# Patient Record
Sex: MALE | Race: WHITE | NOT HISPANIC OR LATINO | Employment: FULL TIME | URBAN - METROPOLITAN AREA
[De-identification: names, ages, dates, MRNs, and addresses within clinical notes are randomized per-mention and may not be internally consistent; named-entity substitution may affect disease eponyms.]

---

## 2018-01-13 NOTE — RESULT NOTES
Verified Results  (1) TISSUE EXAM [id number] 11:30AM Ivette Ashton     Test Name Result Flag Reference   LAB AP CASE REPORT (Report)     Surgical Pathology Report             Case: [id number]                   Authorizing Provider: London Villasenor MD    Collected:      2016 1130        Pathologist:      Chidi Hein MD      Received:      2016 0907        Specimen:  Skin, Cyst/Tag/Debridement, Chin   LAB AP FINAL DIAGNOSIS (Report)     A  Skin, Cyst/Tag/Debridement, Cyst on chin, excision:  - Ruptured epidermal cyst    -- Fragment of residual epidermal cyst wall, scar, inflamed granulation   tissue and      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 or [employer]  They may not be cleared or approved by the [place]  Food and   Drug Administration  The FDA has determined that such clearance or   approval is not necessary  These tests are used for clinical purposes  They should not be regarded as investigational or for research  This   laboratory has been approved by [place] 88, designated as a high-complexity   laboratory and is qualified to perform these tests  LAB AP GROSS DESCRIPTION (Report)     A  The specimen is received in formalin, labeled with the patient's name   and hospital number, and is designated cyst on chin  The specimen   consists of 2 tan yellow fatty tissue fragments each measuring 0 9 cm  No   skin is identified grossly  Entirely submitted  One cassette  Note: The estimated total formalin fixation time based upon information   provided by the submitting clinician and the standard processing schedule   is over 72 hours   MAC

## 2018-01-16 NOTE — MISCELLANEOUS
Message     Recorded as Task   Date: 08/29/2016 04:07 PM, Created By: Gracie Flores   Task Name: Call Patient with results   Assigned To: Gracie Flores   Regarding Patient: Daiana Monsivais, Status: Active   Comment:    Gracie Flores - 29 Aug 2016 4:07 PM     Patient Phone: [phone]    let him know benign csyt  Patient notified  Active Problems    1  Asthma (493 90) (J45 909)   2  Swelling, mass, or lump on face (784 2) (R22 0)    Current Meds   1  No Reported Medications Recorded    Allergies    1   No Known Drug Allergies    Signatures   Electronically signed by : Heike Gregg, ; Aug 30 2016  2:22PM EST                       (Author)

## 2019-02-02 ENCOUNTER — OFFICE VISIT (OUTPATIENT)
Dept: URGENT CARE | Age: 39
End: 2019-02-02
Payer: COMMERCIAL

## 2019-02-02 VITALS
RESPIRATION RATE: 16 BRPM | BODY MASS INDEX: 23.95 KG/M2 | OXYGEN SATURATION: 99 % | HEIGHT: 68 IN | TEMPERATURE: 97.6 F | WEIGHT: 158 LBS | DIASTOLIC BLOOD PRESSURE: 75 MMHG | SYSTOLIC BLOOD PRESSURE: 122 MMHG | HEART RATE: 68 BPM

## 2019-02-02 DIAGNOSIS — L98.9 BUMPS ON SKIN: Primary | ICD-10-CM

## 2019-02-02 PROCEDURE — 99203 OFFICE O/P NEW LOW 30 MIN: CPT | Performed by: PHYSICIAN ASSISTANT

## 2019-02-02 RX ORDER — CEPHALEXIN 500 MG/1
500 CAPSULE ORAL EVERY 12 HOURS SCHEDULED
Qty: 14 CAPSULE | Refills: 0 | Status: SHIPPED | OUTPATIENT
Start: 2019-02-02 | End: 2019-02-09

## 2019-02-02 NOTE — PROGRESS NOTES
[employer] [facility]        NAME: Jhony Serrano is a 43 y o  male  : 1980    MRN: [id number]  DATE: 2019  TIME: 9:19 AM    Assessment and Plan   Bumps on skin [L98 9]  1  Bumps on skin  cephalexin (KEFLEX) 500 mg capsule         Patient Instructions     Warm compresses with tea bag 10-15 minutes 4-6 times per day  Watch for signs of infection  Hold Keflex as prescribed until signs of infection occur (redness, warmth, fever or chills)  Follow up with PCP in 3-5 days  Proceed to  ER if symptoms worsen  Chief Complaint     Chief Complaint   Patient presents with    Mass     behind right ear; noticed it a week and half ago;  no illness; bump is painful and gets swollen         History of Present Illness       Patient admits to painful "bump" that he noticed behind R ear that occasionally swells throughout the day x 1 5 weeks that has not improved with warm compresses multiple times per day  States he had his haircut a few days before he noticed it  Denies ear pain, ear/"bump" discharge, fever, chills, other URI symptoms  Denies prior similar symptoms  Review of Systems   Review of Systems   Constitutional: Negative for chills and fever  HENT: Negative for congestion, ear discharge, ear pain, postnasal drip, rhinorrhea, sinus pain, sinus pressure, sneezing, sore throat and trouble swallowing  Respiratory: Negative for cough  Skin: Negative for color change, rash and wound  Allergic/Immunologic: Negative for environmental allergies and food allergies  Neurological: Negative for headaches           Current Medications       Current Outpatient Prescriptions:     cephalexin (KEFLEX) 500 mg capsule, Take 1 capsule (500 mg total) by mouth every 12 (twelve) hours for 7 days, Disp: 14 capsule, Rfl: 0    Current Allergies     Allergies as of 2019 - Reviewed 2019   Allergen Reaction Noted    Mobic [meloxicam] Shortness Of Breath 2019            The following portions of the patient's history were reviewed and updated as appropriate: allergies, current medications, past family history, past medical history, past social history, past surgical history and problem list      Past Medical History:   Diagnosis Date    Asthma        History reviewed  No pertinent surgical history  No family history on file  Medications have been verified  Objective   /75   Pulse 68   Temp 97 6 °F (36 4 °C)   Resp 16   Ht 5' 8" (1 727 m)   Wt 71 7 kg (158 lb)   SpO2 99%   BMI 24 02 kg/m²        Physical Exam     Physical Exam   Constitutional: He is oriented to person, place, and time  He appears well-developed and well-nourished  No distress  HENT:   Head: Normocephalic and atraumatic  Cardiovascular: Normal rate, regular rhythm and normal heart sounds  Exam reveals no gallop and no friction rub  No murmur heard  Pulmonary/Chest: Effort normal and breath sounds normal  No respiratory distress  He has no wheezes  He has no rales  He exhibits no tenderness  Lymphadenopathy:     He has no cervical adenopathy  Neurological: He is alert and oriented to person, place, and time  Skin: Skin is warm  He is not diaphoretic  Approximately two 0 5cm x 0 5cm TTP raised "bumps" posterior and superior to R ear without erythema, edema, discharge, or portal of entry

## 2019-02-02 NOTE — PATIENT INSTRUCTIONS
Warm compresses with tea bag 10-15 minutes 4-6 times per day  Watch for signs of infection  Hold Keflex as prescribed until signs of infection occur (redness, warmth, fever or chills)  Follow up with PCP in 3-5 days  Proceed to  ER if symptoms worsen

## 2019-11-08 ENCOUNTER — HOSPITAL ENCOUNTER (OUTPATIENT)
Dept: RADIOLOGY | Facility: IMAGING CENTER | Age: 39
Discharge: HOME | End: 2019-11-08
Payer: COMMERCIAL

## 2019-11-08 ENCOUNTER — TRANSCRIBE ORDERS (OUTPATIENT)
Dept: ADMINISTRATIVE | Facility: HOSPITAL | Age: 39
End: 2019-11-08

## 2019-11-08 DIAGNOSIS — M99.05 SOMATIC DYSFUNCTION OF PELVIS REGION: ICD-10-CM

## 2019-11-08 DIAGNOSIS — M54.32 BILATERAL SCIATICA: Primary | ICD-10-CM

## 2019-11-08 DIAGNOSIS — M54.31 BILATERAL SCIATICA: ICD-10-CM

## 2019-11-08 DIAGNOSIS — M99.03 SOMATIC DYSFUNCTION OF LUMBAR REGION: ICD-10-CM

## 2019-11-08 DIAGNOSIS — M54.31 BILATERAL SCIATICA: Primary | ICD-10-CM

## 2019-11-08 DIAGNOSIS — M54.32 BILATERAL SCIATICA: ICD-10-CM

## 2019-11-08 PROCEDURE — 72110 X-RAY EXAM L-2 SPINE 4/>VWS: CPT

## 2019-11-21 ENCOUNTER — NURSE TRIAGE (OUTPATIENT)
Dept: PHYSICAL THERAPY | Facility: OTHER | Age: 39
End: 2019-11-21

## 2019-11-21 DIAGNOSIS — M54.41 ACUTE BILATERAL LOW BACK PAIN WITH RIGHT-SIDED SCIATICA: Primary | ICD-10-CM

## 2019-11-21 NOTE — TELEPHONE ENCOUNTER
Background - Initial Assessment  Clinical complaint: Patient states 2 weeks ago he was putting his pants on and felt a terrible pain in his lower back  Patient states he is having pain in the lower back with some numbness and tingling down the right leg  Patient states he has "been going to a chiropractor and got some relief but it has not completely gone away " Patient states he has no history of any back problems  Date of onset: 11/7/19  Frequency of pain: constant dull ache with  Intermittent sharp pain and numbness  Quality of pain: aching and dull  occasional sharp pain and numbness in the buttocks and right leg  Protocols used: [facility] COMPREHENSIVE SPINE PROGRAM PROTOCOL    This RN did review in detail the Comprehensive Spine Program and what we can provide for their back pain  Patient is agreeable to being triaged by this RN and would like to proceed with Physical Therapy  Referral was placed for Physical Therapy at the [facility]  Patients information was sent to the  to make evaluation appointment  Patient informed that the PT office will be calling him to schedule his appointment    No further questions and/or concerns were voiced by the patient at this time  Patient states understanding of the referral that was placed

## 2019-11-27 ENCOUNTER — EVALUATION (OUTPATIENT)
Dept: PHYSICAL THERAPY | Facility: REHABILITATION | Age: 39
End: 2019-11-27
Payer: COMMERCIAL

## 2019-11-27 DIAGNOSIS — M54.41 ACUTE BILATERAL LOW BACK PAIN WITH RIGHT-SIDED SCIATICA: ICD-10-CM

## 2019-11-27 PROCEDURE — 97112 NEUROMUSCULAR REEDUCATION: CPT | Performed by: PHYSICAL THERAPIST

## 2019-11-27 PROCEDURE — 97161 PT EVAL LOW COMPLEX 20 MIN: CPT | Performed by: PHYSICAL THERAPIST

## 2019-11-27 NOTE — PROGRESS NOTES
Evaluation    Today's date: 2019  Patient name: Kasandra Meier  : 1980  MRN: [id number]  Referring provider: Kashif Arriaza MD  Dx: No diagnosis found  Assessment/Plan Pt presents with symptoms consistent with broad based lower lumbar derangement with a prominent R sided presentation that initially peripheralized to his knee but centralizes nicely with extension biased Nate treatment  Plan to progress with Nate therapy and add manual therapy, traction, and other modalities as needed  Subjective Evaluation    History of Present Illness  Mechanism of injury: Pt began to notice pain after bending forward to put on pants  Says that pain starts in spine and shoots down into his buttocks bilaterally    Pain  Current pain ratin  At best pain rating: 3  At worst pain rating: 10  Quality: radiating and sharp  Relieving factors: heat and medications  Aggravating factors: walking and lifting    Treatments  Previous treatment: chiropractic  Current treatment: chiropractic  Patient Goals  Patient goals for therapy: return to work and decreased pain          Objective     Neurological Testing     Sensation     Lumbar   Left   Intact: light touch    Right   Intact: light touch    Reflexes   Left   Patellar (L4): normal (2+)  Achilles (S1): normal (2+)    Right   Patellar (L4): normal (2+)  Achilles (S1): normal (2+)    Active Range of Motion     Lumbar   Flexion:  with pain Restriction level: maximal  Extension:  with pain Restriction level: maximal  Left lateral flexion:  with pain Restriction level: moderate  Right lateral flexion:  with pain Restriction level: moderate  Left rotation:  with pain Restriction level: moderate  Right rotation:  with pain Restriction level: moderate  Mechanical Assessment    Cervical      Thoracic      Lumbar    Standing flexion:   Pain location:peripheralized  Pain intensity: worse  Sitting flexion:   Pain location: peripheralized  Pain intensity: worse  Standing extension:   Pain location: centralized  Pain intensity: worse  Pain level: decreased  Right sidegliding:   Pain location: centralized  Pain intensity:better  Pain level: decreased    Strength/Myotome Testing     Lumbar   Left   Normal strength    Right   Normal strength    Tests     Lumbar     Left   Positive crossed SLR and slump test      Right   Positive passive SLR and slump test      STG  1  Pain level no greater than 2/10  2  Independent with HEP  LTG  1  Pt will be able to perform all ADLs and IADLs without LBP and radicular symptoms  2  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                           Standing lumbar extensions 3x10            Right side glides 3x10                                                                                                                                                                        Modalities             Traction - if needed               X=performed        '

## 2019-12-02 ENCOUNTER — OFFICE VISIT (OUTPATIENT)
Dept: PHYSICAL THERAPY | Facility: REHABILITATION | Age: 39
End: 2019-12-02
Payer: COMMERCIAL

## 2019-12-02 DIAGNOSIS — M54.41 ACUTE BILATERAL LOW BACK PAIN WITH RIGHT-SIDED SCIATICA: Primary | ICD-10-CM

## 2019-12-02 PROCEDURE — 97140 MANUAL THERAPY 1/> REGIONS: CPT | Performed by: PHYSICAL THERAPIST

## 2019-12-02 PROCEDURE — 97112 NEUROMUSCULAR REEDUCATION: CPT | Performed by: PHYSICAL THERAPIST

## 2019-12-02 NOTE — PROGRESS NOTES
Daily Note     Today's date: 2019  Patient name: Wallis Peabody  : 1980  MRN: [id number]  Referring provider: Amy Jones MD  Dx:   Encounter Diagnosis     ICD-10-CM    1  Acute bilateral low back pain with right-sided sciatica M54 41                Subjective: Pt says he feels less shooting pains down into his legs, but still notes numbness into his buttocks  Pt feels numbness when lying and sitting but not when standing  Pt says he has completed home exercises and has noticed improvements  Pt says he has 2-3/10 pain at worst  He thinks he is about 25% as sore as he was last week  He saw a chiropractor on Friday, and the chiropractor said that his hips are uneven  Pt saw an orthopedic doctor and said that the doctor noticed he walks with pronation and discussed his insoles in his shoes  Pt says that motrin irritates his stomach  He says he finds most pain relief when lying on his L side on the floor on top of a pillow  Pt also uses a TENS unit and ice to relieve pain before bed  Objective: See treatment diary below      Assessment: Pt responded well to PA mobs on L3, but pt reports radicular pain when performing PA mobs on L4  He says pain is about a 4-5/10  Pt had tightness in lumbar multifidi  Pt says PA mobs help relieve compression off spine  Pt displayed increased lumbar extension ROM with standing extension, and he noted feeling a pop when reaching end range  Radicular symptoms are centralizing with Nate exercises  Pt was instructed to expect increased LBP, but symptoms should remain centralized  Pt was instructed to use tylenol to help manage the pain at home  Plan: Continue per plan of care        Precautions: Poor lumbar posture    Daily Treatment Diary     Assessment            Eval/Reval             FOTO     **     **   POC Signed             HEP Issued              Manuals             PA lumbar mobs nv DK           STM bilateral psoas nv Exercise Diary              Treadmill?                           Standing lumbar extensions 3x10 3x10           Right side glides 3x10                                                                                                                                                                        Modalities             CP  10'           Traction - if needed               X=performed        '

## 2019-12-05 ENCOUNTER — APPOINTMENT (OUTPATIENT)
Dept: PHYSICAL THERAPY | Facility: REHABILITATION | Age: 39
End: 2019-12-05
Payer: COMMERCIAL

## 2019-12-09 ENCOUNTER — OFFICE VISIT (OUTPATIENT)
Dept: PHYSICAL THERAPY | Facility: REHABILITATION | Age: 39
End: 2019-12-09
Payer: COMMERCIAL

## 2019-12-09 DIAGNOSIS — M54.41 ACUTE BILATERAL LOW BACK PAIN WITH RIGHT-SIDED SCIATICA: Primary | ICD-10-CM

## 2019-12-09 PROCEDURE — 97140 MANUAL THERAPY 1/> REGIONS: CPT | Performed by: PHYSICAL THERAPIST

## 2019-12-09 PROCEDURE — 97112 NEUROMUSCULAR REEDUCATION: CPT | Performed by: PHYSICAL THERAPIST

## 2019-12-09 NOTE — PROGRESS NOTES
Daily Note     Today's date: 2019  Patient name: Valdo Velasco  : 1980  MRN: [id number]  Referring provider: Kadi Zhong MD  Dx:   Encounter Diagnosis     ICD-10-CM    1  Acute bilateral low back pain with right-sided sciatica M54 41                   Subjective: Patient noted pain in buttock and low back pre treatment  Good lordotic lumbar curve centralizes symptoms nicely  Working to do a lot of back bends  Objective: See treatment diary below      Assessment: Trial of extension with overpressure today was not able to further ameliorate his back pain  He is very tender still at his lower lumbar spine  Movement assessment today with all motions did not change his symptoms  At this point I would like to have him get one more adjustment with the chiropractor and if that is not helpful then he will benefit from pain management  He will return to therapy is his status changes  Plan: Continue as indicated by change in status     Precautions: Poor lumbar posture    Daily Treatment Diary     Assessment           Eval/Reval             FOTO     **     **   POC Signed             HEP Issued              Manuals             PA lumbar mobs nv DK MW          STM bilateral psoas nv  MW                                    Exercise Diary              Treadmill?                           Standing lumbar extensions 3x10 3x10 3x10          Right side glides 3x10            Prone push up with belt    5"x10                       Postural ed MW MW MW                                                                                                                               Modalities             CP  10'           Traction - if needed               X=performed        '

## 2019-12-12 ENCOUNTER — APPOINTMENT (OUTPATIENT)
Dept: PHYSICAL THERAPY | Facility: REHABILITATION | Age: 39
End: 2019-12-12
Payer: COMMERCIAL